# Patient Record
Sex: FEMALE | Race: WHITE | NOT HISPANIC OR LATINO | Employment: PART TIME | URBAN - METROPOLITAN AREA
[De-identification: names, ages, dates, MRNs, and addresses within clinical notes are randomized per-mention and may not be internally consistent; named-entity substitution may affect disease eponyms.]

---

## 2017-03-30 PROCEDURE — G0145 SCR C/V CYTO,THINLAYER,RESCR: HCPCS | Performed by: OBSTETRICS & GYNECOLOGY

## 2017-03-31 ENCOUNTER — LAB REQUISITION (OUTPATIENT)
Dept: LAB | Facility: HOSPITAL | Age: 57
End: 2017-03-31
Payer: COMMERCIAL

## 2017-03-31 DIAGNOSIS — Z01.419 ENCOUNTER FOR GYNECOLOGICAL EXAMINATION WITHOUT ABNORMAL FINDING: ICD-10-CM

## 2017-03-31 DIAGNOSIS — Z12.72 ENCOUNTER FOR SCREENING FOR MALIGNANT NEOPLASM OF VAGINA: ICD-10-CM

## 2017-03-31 DIAGNOSIS — Z11.51 ENCOUNTER FOR SCREENING FOR HUMAN PAPILLOMAVIRUS (HPV): ICD-10-CM

## 2017-04-05 LAB
LAB AP GYN PRIMARY INTERPRETATION: NORMAL
Lab: NORMAL

## 2017-12-20 ENCOUNTER — HOSPITAL ENCOUNTER (OUTPATIENT)
Dept: RADIOLOGY | Age: 57
Discharge: HOME | End: 2017-12-20
Payer: COMMERCIAL

## 2017-12-20 ENCOUNTER — GENERIC CONVERSION - ENCOUNTER (OUTPATIENT)
Dept: OTHER | Facility: OTHER | Age: 57
End: 2017-12-20

## 2017-12-20 DIAGNOSIS — Z12.31 ENCOUNTER FOR SCREENING MAMMOGRAM FOR MALIGNANT NEOPLASM OF BREAST: ICD-10-CM

## 2017-12-20 PROCEDURE — G0202 SCR MAMMO BI INCL CAD: HCPCS

## 2018-04-12 ENCOUNTER — LAB REQUISITION (OUTPATIENT)
Dept: LAB | Facility: HOSPITAL | Age: 58
End: 2018-04-12
Payer: COMMERCIAL

## 2018-04-12 DIAGNOSIS — Z01.419 ENCOUNTER FOR GYNECOLOGICAL EXAMINATION WITHOUT ABNORMAL FINDING: ICD-10-CM

## 2018-04-12 DIAGNOSIS — Z11.51 ENCOUNTER FOR SCREENING FOR HUMAN PAPILLOMAVIRUS (HPV): ICD-10-CM

## 2018-04-12 PROCEDURE — G0145 SCR C/V CYTO,THINLAYER,RESCR: HCPCS | Performed by: OBSTETRICS & GYNECOLOGY

## 2018-04-12 PROCEDURE — 87624 HPV HI-RISK TYP POOLED RSLT: CPT | Performed by: OBSTETRICS & GYNECOLOGY

## 2018-04-16 LAB
HPV RRNA GENITAL QL NAA+PROBE: NORMAL
LAB AP GYN PRIMARY INTERPRETATION: NORMAL
Lab: NORMAL

## 2018-12-26 ENCOUNTER — HOSPITAL ENCOUNTER (OUTPATIENT)
Dept: RADIOLOGY | Age: 58
Discharge: HOME | End: 2018-12-26
Payer: COMMERCIAL

## 2018-12-26 VITALS — BODY MASS INDEX: 21.66 KG/M2 | HEIGHT: 65 IN | WEIGHT: 130 LBS

## 2018-12-26 DIAGNOSIS — Z12.31 ENCOUNTER FOR SCREENING MAMMOGRAM FOR MALIGNANT NEOPLASM OF BREAST: ICD-10-CM

## 2018-12-26 PROCEDURE — 77063 BREAST TOMOSYNTHESIS BI: CPT

## 2018-12-26 PROCEDURE — 77067 SCR MAMMO BI INCL CAD: CPT

## 2019-04-18 ENCOUNTER — ANNUAL EXAM (OUTPATIENT)
Dept: OBGYN CLINIC | Facility: CLINIC | Age: 59
End: 2019-04-18
Payer: COMMERCIAL

## 2019-04-18 VITALS
HEIGHT: 65 IN | DIASTOLIC BLOOD PRESSURE: 62 MMHG | SYSTOLIC BLOOD PRESSURE: 100 MMHG | WEIGHT: 118 LBS | BODY MASS INDEX: 19.66 KG/M2

## 2019-04-18 DIAGNOSIS — Z12.39 SCREENING FOR BREAST CANCER: Primary | ICD-10-CM

## 2019-04-18 DIAGNOSIS — Z01.419 ENCOUNTER FOR ANNUAL ROUTINE GYNECOLOGICAL EXAMINATION: ICD-10-CM

## 2019-04-18 PROBLEM — N95.2 VAGINAL ATROPHY: Status: ACTIVE | Noted: 2019-04-18

## 2019-04-18 PROCEDURE — S0610 ANNUAL GYNECOLOGICAL EXAMINA: HCPCS | Performed by: OBSTETRICS & GYNECOLOGY

## 2019-04-18 RX ORDER — ATORVASTATIN CALCIUM 20 MG/1
TABLET, FILM COATED ORAL
Refills: 0 | COMMUNITY
Start: 2019-03-25

## 2019-04-18 RX ORDER — DESVENLAFAXINE 100 MG/1
TABLET, EXTENDED RELEASE ORAL
Refills: 0 | COMMUNITY
Start: 2019-03-27

## 2019-04-18 RX ORDER — HYDROXYCHLOROQUINE SULFATE 200 MG/1
TABLET, FILM COATED ORAL
Refills: 0 | COMMUNITY
Start: 2019-03-25

## 2019-10-04 ENCOUNTER — LAB REQUISITION (OUTPATIENT)
Dept: LAB | Facility: HOSPITAL | Age: 59
End: 2019-10-04
Payer: COMMERCIAL

## 2019-10-04 DIAGNOSIS — B95.8 UNSPECIFIED STAPHYLOCOCCUS AS THE CAUSE OF DISEASES CLASSIFIED ELSEWHERE: ICD-10-CM

## 2019-10-04 PROCEDURE — 87070 CULTURE OTHR SPECIMN AEROBIC: CPT | Performed by: PHYSICIAN ASSISTANT

## 2019-10-04 PROCEDURE — 87205 SMEAR GRAM STAIN: CPT | Performed by: PHYSICIAN ASSISTANT

## 2019-10-06 LAB
BACTERIA WND AEROBE CULT: ABNORMAL
GRAM STN SPEC: ABNORMAL
GRAM STN SPEC: ABNORMAL

## 2019-11-07 ENCOUNTER — LAB REQUISITION (OUTPATIENT)
Dept: LAB | Facility: HOSPITAL | Age: 59
End: 2019-11-07
Payer: COMMERCIAL

## 2019-11-07 DIAGNOSIS — B95.8 UNSPECIFIED STAPHYLOCOCCUS AS THE CAUSE OF DISEASES CLASSIFIED ELSEWHERE: ICD-10-CM

## 2019-11-07 PROCEDURE — 87070 CULTURE OTHR SPECIMN AEROBIC: CPT | Performed by: PHYSICIAN ASSISTANT

## 2019-11-07 PROCEDURE — 87205 SMEAR GRAM STAIN: CPT | Performed by: PHYSICIAN ASSISTANT

## 2019-11-09 LAB
BACTERIA WND AEROBE CULT: NORMAL
GRAM STN SPEC: NORMAL
GRAM STN SPEC: NORMAL

## 2019-12-02 ENCOUNTER — APPOINTMENT (EMERGENCY)
Dept: RADIOLOGY | Facility: HOSPITAL | Age: 59
End: 2019-12-02
Payer: COMMERCIAL

## 2019-12-02 ENCOUNTER — HOSPITAL ENCOUNTER (EMERGENCY)
Facility: HOSPITAL | Age: 59
Discharge: HOME | End: 2019-12-02
Attending: EMERGENCY MEDICINE | Admitting: EMERGENCY MEDICINE
Payer: COMMERCIAL

## 2019-12-02 VITALS
DIASTOLIC BLOOD PRESSURE: 64 MMHG | HEART RATE: 72 BPM | RESPIRATION RATE: 18 BRPM | TEMPERATURE: 98.7 F | OXYGEN SATURATION: 96 % | SYSTOLIC BLOOD PRESSURE: 132 MMHG | BODY MASS INDEX: 18.92 KG/M2 | WEIGHT: 113.54 LBS | HEIGHT: 65 IN

## 2019-12-02 DIAGNOSIS — R07.9 CHEST PAIN, UNSPECIFIED: Primary | ICD-10-CM

## 2019-12-02 LAB
ALBUMIN SERPL BCP-MCNC: 3.8 G/DL (ref 3.5–5)
ALP SERPL-CCNC: 73 U/L (ref 46–116)
ALT SERPL W P-5'-P-CCNC: 10 U/L (ref 12–78)
ANION GAP SERPL CALCULATED.3IONS-SCNC: 9 MMOL/L (ref 4–13)
AST SERPL W P-5'-P-CCNC: 13 U/L (ref 5–45)
BASOPHILS # BLD AUTO: 0.02 THOUSANDS/ΜL (ref 0–0.1)
BASOPHILS NFR BLD AUTO: 1 % (ref 0–1)
BILIRUB DIRECT SERPL-MCNC: 0.09 MG/DL (ref 0–0.2)
BILIRUB SERPL-MCNC: 0.31 MG/DL (ref 0.2–1)
BUN SERPL-MCNC: 18 MG/DL (ref 5–25)
CALCIUM SERPL-MCNC: 9.6 MG/DL (ref 8.3–10.1)
CHLORIDE SERPL-SCNC: 107 MMOL/L (ref 100–108)
CO2 SERPL-SCNC: 30 MMOL/L (ref 21–32)
CREAT SERPL-MCNC: 0.86 MG/DL (ref 0.6–1.3)
EOSINOPHIL # BLD AUTO: 0.07 THOUSAND/ΜL (ref 0–0.61)
EOSINOPHIL NFR BLD AUTO: 2 % (ref 0–6)
ERYTHROCYTE [DISTWIDTH] IN BLOOD BY AUTOMATED COUNT: 12 % (ref 11.6–15.1)
GFR SERPL CREATININE-BSD FRML MDRD: 73 ML/MIN/1.73SQ M
GLUCOSE SERPL-MCNC: 100 MG/DL (ref 65–140)
HCT VFR BLD AUTO: 38.1 % (ref 34.8–46.1)
HGB BLD-MCNC: 12.7 G/DL (ref 11.5–15.4)
IMM GRANULOCYTES # BLD AUTO: 0.01 THOUSAND/UL (ref 0–0.2)
IMM GRANULOCYTES NFR BLD AUTO: 0 % (ref 0–2)
LIPASE SERPL-CCNC: 133 U/L (ref 73–393)
LYMPHOCYTES # BLD AUTO: 0.89 THOUSANDS/ΜL (ref 0.6–4.47)
LYMPHOCYTES NFR BLD AUTO: 27 % (ref 14–44)
MCH RBC QN AUTO: 32.2 PG (ref 26.8–34.3)
MCHC RBC AUTO-ENTMCNC: 33.3 G/DL (ref 31.4–37.4)
MCV RBC AUTO: 97 FL (ref 82–98)
MONOCYTES # BLD AUTO: 0.33 THOUSAND/ΜL (ref 0.17–1.22)
MONOCYTES NFR BLD AUTO: 10 % (ref 4–12)
NEUTROPHILS # BLD AUTO: 1.98 THOUSANDS/ΜL (ref 1.85–7.62)
NEUTS SEG NFR BLD AUTO: 60 % (ref 43–75)
NRBC BLD AUTO-RTO: 0 /100 WBCS
PLATELET # BLD AUTO: 173 THOUSANDS/UL (ref 149–390)
PMV BLD AUTO: 9 FL (ref 8.9–12.7)
POTASSIUM SERPL-SCNC: 4 MMOL/L (ref 3.5–5.3)
PROT SERPL-MCNC: 7.5 G/DL (ref 6.4–8.2)
RBC # BLD AUTO: 3.95 MILLION/UL (ref 3.81–5.12)
SODIUM SERPL-SCNC: 146 MMOL/L (ref 136–145)
TROPONIN I SERPL-MCNC: 0.01 NG/ML
TROPONIN I SERPL-MCNC: <0.02 NG/ML
WBC # BLD AUTO: 3.3 THOUSAND/UL (ref 4.31–10.16)

## 2019-12-02 PROCEDURE — 80076 HEPATIC FUNCTION PANEL: CPT | Performed by: PHYSICIAN ASSISTANT

## 2019-12-02 PROCEDURE — 84484 ASSAY OF TROPONIN QUANT: CPT | Performed by: PHYSICIAN ASSISTANT

## 2019-12-02 PROCEDURE — 85025 COMPLETE CBC W/AUTO DIFF WBC: CPT | Performed by: PHYSICIAN ASSISTANT

## 2019-12-02 PROCEDURE — 36415 COLL VENOUS BLD VENIPUNCTURE: CPT | Performed by: PHYSICIAN ASSISTANT

## 2019-12-02 PROCEDURE — 99285 EMERGENCY DEPT VISIT HI MDM: CPT | Performed by: PHYSICIAN ASSISTANT

## 2019-12-02 PROCEDURE — 80048 BASIC METABOLIC PNL TOTAL CA: CPT | Performed by: PHYSICIAN ASSISTANT

## 2019-12-02 PROCEDURE — 83690 ASSAY OF LIPASE: CPT | Performed by: PHYSICIAN ASSISTANT

## 2019-12-02 PROCEDURE — 99285 EMERGENCY DEPT VISIT HI MDM: CPT

## 2019-12-02 PROCEDURE — 71046 X-RAY EXAM CHEST 2 VIEWS: CPT

## 2019-12-02 PROCEDURE — 93005 ELECTROCARDIOGRAM TRACING: CPT

## 2019-12-02 RX ORDER — ACETAMINOPHEN 325 MG/1
650 TABLET ORAL ONCE
Status: COMPLETED | OUTPATIENT
Start: 2019-12-02 | End: 2019-12-02

## 2019-12-02 RX ORDER — SODIUM CHLORIDE 9 MG/ML
3 INJECTION INTRAVENOUS AS NEEDED
Status: DISCONTINUED | OUTPATIENT
Start: 2019-12-02 | End: 2019-12-03 | Stop reason: HOSPADM

## 2019-12-02 RX ORDER — SENNOSIDES 8.6 MG
650 CAPSULE ORAL EVERY 8 HOURS PRN
Qty: 9 TABLET | Refills: 0 | Status: SHIPPED | OUTPATIENT
Start: 2019-12-02 | End: 2019-12-05

## 2019-12-02 RX ORDER — HYDROXYZINE HYDROCHLORIDE 25 MG/1
25 TABLET, FILM COATED ORAL ONCE
Status: COMPLETED | OUTPATIENT
Start: 2019-12-02 | End: 2019-12-02

## 2019-12-02 RX ADMIN — HYDROXYZINE HYDROCHLORIDE 25 MG: 25 TABLET ORAL at 22:03

## 2019-12-02 RX ADMIN — ACETAMINOPHEN 650 MG: 325 TABLET, FILM COATED ORAL at 22:03

## 2019-12-03 NOTE — ED PROVIDER NOTES
History  Chief Complaint   Patient presents with    Chest Pain     Pt reports sudden onset of chest tightness while she was eating dinner tonight -SOB -fevers Denies any other complaints  States she has had similar issues in the past, d/t stress  Patient is a 55-year-old female with history of polymyalgia history of breast cyst aspiration a presents emergency department with gradual onset chest tightness for 2 hours  Patient denies associated symptomatology  Patient states that she was shopping all day, lifting heavy objects and bags and when she was eating dinner this evening with her son, she felt a gradual onset chest tightness  Patient thought that her bra was too tight, and she removed abrupt her chest tightness persisting  Patient consult of her friend with patient's friends indication for patient comes emergency department for chest pain evaluation  Patient states that she had a exercise stress test 10 years prior to current ED presentation which was unremarkable to which she had at [address]  Patient denies new medications  Patient denies history of PE, DVT, and thrombophlebitis  Patient also verbalizes that she has a history of mitral valve prolapse  Patient denies blood thinners  Patient denies palliative and provocative factors  Patient denies not effective treatment  Patient denies palpitations, and chest pain abatement with rest   Patient denies fevers, chills, nausea, vomiting  Patient denies diarrhea, constipation, urinary symptoms  Patient denies headaches, tinnitus, dizziness, meningeal, and vertiginous symptoms  Patient denies numbness, tingling, loss of power  Patient denies recent fall or recent trauma  Patient denies sick contacts or recent travel  Patient denies shortness of breath, and abdominal pain  Patient is not in acute distress          History provided by:  Patient   used: No    Chest Pain   Pain location:  Substernal area  Pain quality: tightness    Pain radiates to:  Does not radiate  Pain radiates to the back: no    Pain severity:  Mild  Onset quality:  Gradual  Duration:  2 hours  Timing:  Constant  Progression:  Unchanged  Chronicity:  Recurrent  Context: stress    Relieved by:  Nothing  Worsened by:  Nothing tried  Ineffective treatments:  None tried  Associated symptoms: anxiety    Associated symptoms: no abdominal pain, no back pain, no claudication, no cough, no dizziness, no fatigue, no fever, no headache, no nausea, no numbness, no palpitations, no PND, no shortness of breath, no syncope, not vomiting and no weakness    Risk factors: high cholesterol    Risk factors: no coronary artery disease, no hypertension, not obese, no prior DVT/PE, no smoking and no surgery        Prior to Admission Medications   Prescriptions Last Dose Informant Patient Reported? Taking? Cholecalciferol 1000 units tablet   Yes No   Sig: Take 2,000 Units by mouth   atorvastatin (LIPITOR) 20 mg tablet   Yes No   cyanocobalamin 1000 MCG tablet   Yes No   Sig: Take 1,000 mcg by mouth   desvenlafaxine (PRISTIQ) 100 mg 24 hr tablet   Yes No   hydroxychloroquine (PLAQUENIL) 200 mg tablet   Yes No      Facility-Administered Medications: None       Past Medical History:   Diagnosis Date    Polymyalgia ([facility] [address] ) 2017       Past Surgical History:   Procedure Laterality Date    BREAST CYST ASPIRATION Left 1992    Benign    COLONOSCOPY  2007       Family History   Problem Relation Age of Onset    Breast cancer Paternal Aunt      I have reviewed and agree with the history as documented  Social History     Tobacco Use    Smoking status: Never Smoker    Smokeless tobacco: Never Used   Substance Use Topics    Alcohol use: Not Currently     Frequency: 2-4 times a month     Comment: occassionally    Drug use: Not on file        Review of Systems   Constitutional: Negative for activity change, appetite change, chills, fatigue and fever     HENT: Negative for congestion, postnasal drip, rhinorrhea, sinus pressure, sinus pain, sore throat and tinnitus  Eyes: Negative for photophobia and visual disturbance  Respiratory: Negative for cough, chest tightness and shortness of breath  Cardiovascular: Positive for chest pain  Negative for palpitations, claudication, syncope and PND  Gastrointestinal: Negative for abdominal pain, constipation, diarrhea, nausea and vomiting  Genitourinary: Negative for difficulty urinating, dysuria, flank pain, frequency and urgency  Musculoskeletal: Negative for back pain, gait problem, neck pain and neck stiffness  Skin: Negative for pallor and rash  Allergic/Immunologic: Negative for environmental allergies and food allergies  Neurological: Negative for dizziness, weakness, numbness and headaches  Psychiatric/Behavioral: Negative for confusion  All other systems reviewed and are negative  Physical Exam  Physical Exam   Constitutional: She is oriented to person, place, and time  She appears well-developed and well-nourished  She is active and cooperative  Non-toxic appearance  She does not have a sickly appearance  She does not appear ill  No distress  HENT:   Head: Normocephalic and atraumatic  Right Ear: Hearing and external ear normal  No drainage, swelling or tenderness  No mastoid tenderness  No decreased hearing is noted  Left Ear: Hearing and external ear normal  No drainage, swelling or tenderness  No mastoid tenderness  No decreased hearing is noted  Nose: Nose normal    Mouth/Throat: Uvula is midline, oropharynx is clear and moist and mucous membranes are normal    Eyes: Pupils are equal, round, and reactive to light  Conjunctivae, EOM and lids are normal  Right eye exhibits no discharge  Left eye exhibits no discharge  Neck: Trachea normal, normal range of motion, full passive range of motion without pain and phonation normal  Neck supple  No JVD present   No tracheal tenderness, no spinous process tenderness and no muscular tenderness present  No neck rigidity  No tracheal deviation and normal range of motion present  Cardiovascular: Normal rate, regular rhythm, normal heart sounds, intact distal pulses and normal pulses  Pulses:       Radial pulses are 2+ on the right side, and 2+ on the left side  Posterior tibial pulses are 2+ on the right side, and 2+ on the left side  Pulmonary/Chest: Effort normal and breath sounds normal  No stridor  She has no decreased breath sounds  She has no wheezes  She has no rhonchi  She has no rales  She exhibits no tenderness, no bony tenderness and no crepitus  Abdominal: Soft  Bowel sounds are normal  She exhibits no distension  There is no tenderness  There is no rigidity, no rebound, no guarding and no CVA tenderness  Musculoskeletal: Normal range of motion  Passive ROM intact  Upper and lower extremity 5/5 bilaterally  Neurovascularly intact  No grinding or clicking of joints     Lymphadenopathy:        Head (right side): No submental, no submandibular, no tonsillar, no preauricular, no posterior auricular and no occipital adenopathy present  Head (left side): No submental, no submandibular, no tonsillar, no preauricular, no posterior auricular and no occipital adenopathy present  She has no cervical adenopathy  Right cervical: No superficial cervical, no deep cervical and no posterior cervical adenopathy present  Left cervical: No superficial cervical, no deep cervical and no posterior cervical adenopathy present  Neurological: She is alert and oriented to person, place, and time  She has normal strength and normal reflexes  No sensory deficit  GCS eye subscore is 4  GCS verbal subscore is 5  GCS motor subscore is 6  Reflex Scores:       Patellar reflexes are 2+ on the right side and 2+ on the left side  Skin: Skin is warm and intact  Capillary refill takes less than 2 seconds  She is not diaphoretic  Psychiatric: Her speech is normal and behavior is normal  Judgment and thought content normal  Her mood appears anxious  Cognition and memory are normal    Nursing note and vitals reviewed        Vital Signs  ED Triage Vitals [12/02/19 1915]   Temperature Pulse Respirations Blood Pressure SpO2   98 7 °F (37 1 °C) 95 16 156/84 98 %      Temp Source Heart Rate Source Patient Position - Orthostatic VS BP Location FiO2 (%)   Oral Monitor Sitting Right arm --      Pain Score       7           Vitals:    12/02/19 1915 12/02/19 2100 12/02/19 2230 12/02/19 2300   BP: 156/84 125/59 124/61 132/64   Pulse: 95 78 72 72   Patient Position - Orthostatic VS: Sitting Sitting Sitting          Visual Acuity      ED Medications  Medications   sodium chloride (PF) 0 9 % injection 3 mL (has no administration in time range)   acetaminophen (TYLENOL) tablet 650 mg (650 mg Oral Given 12/2/19 2203)   hydrOXYzine HCL (ATARAX) tablet 25 mg (25 mg Oral Given 12/2/19 2203)       Diagnostic Studies  Results Reviewed     Procedure Component Value Units Date/Time    Troponin I repeat in 3 hrs [[id number]]  (Normal) Collected:  12/02/19 2221    Lab Status:  Final result Specimen:  Blood from Arm, Right Updated:  12/02/19 2250     Troponin I <0 02 ng/mL     Troponin I [[id number]] Collected:  12/02/19 2016    Lab Status:  Final result Specimen:  Blood from Arm, Right Updated:  12/02/19 2044     Troponin I 0 01 ng/mL     Hepatic function panel [[id number]]  (Abnormal) Collected:  12/02/19 2016    Lab Status:  Final result Specimen:  Blood from Arm, Right Updated:  12/02/19 2042     Total Bilirubin 0 31 mg/dL      Bilirubin, Direct 0 09 mg/dL      Alkaline Phosphatase 73 U/L      AST 13 U/L      ALT 10 U/L      Total Protein 7 5 g/dL      Albumin 3 8 g/dL     Lipase [[id number]]  (Normal) Collected:  12/02/19 2016    Lab Status:  Final result Specimen:  Blood from Arm, Right Updated:  12/02/19 2042     Lipase 133 u/L     Basic metabolic panel [[id number]] (Abnormal) Collected:  12/02/19 2016    Lab Status:  Final result Specimen:  Blood from Arm, Right Updated:  12/02/19 2041     Sodium 146 mmol/L      Potassium 4 0 mmol/L      Chloride 107 mmol/L      CO2 30 mmol/L      ANION GAP 9 mmol/L      BUN 18 mg/dL      Creatinine 0 86 mg/dL      Glucose 100 mg/dL      Calcium 9 6 mg/dL      eGFR 73 ml/min/1 73sq m     Narrative:       [place] guidelines for Chronic Kidney Disease (CKD):     Stage 1 with normal or high GFR (GFR > 90 mL/min/1 73 square meters)    Stage 2 Mild CKD (GFR = 60-89 mL/min/1 73 square meters)    Stage 3A Moderate CKD (GFR = 45-59 mL/min/1 73 square meters)    Stage 3B Moderate CKD (GFR = 30-44 mL/min/1 73 square meters)    Stage 4 Severe CKD (GFR = 15-29 mL/min/1 73 square meters)    Stage 5 End Stage CKD (GFR <15 mL/min/1 73 square meters)  Note: GFR calculation is accurate only with a steady state creatinine    CBC and differential [[id number]]  (Abnormal) Collected:  12/02/19 2016    Lab Status:  Final result Specimen:  Blood from Arm, Right Updated:  12/02/19 2024     WBC 3 30 Thousand/uL      RBC 3 95 Million/uL      Hemoglobin 12 7 g/dL      Hematocrit 38 1 %      MCV 97 fL      MCH 32 2 pg      MCHC 33 3 g/dL      RDW 12 0 %      MPV 9 0 fL      Platelets 353 Thousands/uL      nRBC 0 /100 WBCs      Neutrophils Relative 60 %      Immat GRANS % 0 %      Lymphocytes Relative 27 %      Monocytes Relative 10 %      Eosinophils Relative 2 %      Basophils Relative 1 %      Neutrophils Absolute 1 98 Thousands/µL      Immature Grans Absolute 0 01 Thousand/uL      Lymphocytes Absolute 0 89 Thousands/µL      Monocytes Absolute 0 33 Thousand/µL      Eosinophils Absolute 0 07 Thousand/µL      Basophils Absolute 0 02 Thousands/µL                  X-ray chest 2 views    (Results Pending)              Procedures  ECG 12 Lead Documentation Only  Date/Time: 12/2/2019 7:43 PM  Performed by: Gonzales Alex PA-C  Authorized by: Eliberto Habermann, PA-C     Indications / Diagnosis:  Chest pain - intial  ECG reviewed by me, the ED Provider: yes    Patient location:  ED  Previous ECG:     Previous ECG:  Compared to current    Similarity:  No change    Comparison to cardiac monitor: Yes    Interpretation:     Interpretation: normal    Rate:     ECG rate:  80    ECG rate assessment: normal    Rhythm:     Rhythm: sinus rhythm    Ectopy:     Ectopy: none    QRS:     QRS axis:  Normal    QRS intervals:  Normal  Conduction:     Conduction: normal    ST segments:     ST segments:  Normal  T waves:     T waves: normal      ECG 12 Lead Documentation Only  Date/Time: 12/2/2019 10:56 PM  Performed by: Eliberto Habermann, PA-C  Authorized by: Eliberto Habermann, PA-C     Indications / Diagnosis:  Delta ECG  ECG reviewed by me, the ED Provider: yes    Patient location:  ED  Previous ECG:     Previous ECG:  Compared to current    Similarity:  No change    Comparison to cardiac monitor: Yes    Interpretation:     Interpretation: normal    Rate:     ECG rate:  73    ECG rate assessment: normal    Rhythm:     Rhythm: sinus rhythm    Ectopy:     Ectopy: none    QRS:     QRS axis:  Normal    QRS intervals:  Normal  Conduction:     Conduction: normal    ST segments:     ST segments:  Normal  T waves:     T waves: normal               ED Course  ED Course as of Dec 02 2307   Mon Dec 02, 2019   1945 ECG with normal sinus rhythm; no ST elevations or depressions, FL changes no QRS widening with ventricular rate of 80; no change from ECG of May 6, 2015       2028 WBC(!): 3 30   2141 Patient verbalizes that she sees a hematologist for leukopenia currently whom [facility]      2220 Patient with verbal understanding of all clinical laboratory and imaging findings            HEART Risk Score      Most Recent Value   History  0 Filed at: 12/02/2019 2258   ECG  0 Filed at: 12/02/2019 2258   Age  1 Filed at: 12/02/2019 2258   Risk Factors  1 Filed at: 12/02/2019 2258   Troponin  0 Filed at: 12/02/2019 2258   Heart Score Risk Calculator   History  0 Filed at: 12/02/2019 2258   ECG  0 Filed at: 12/02/2019 2258   Age  1 Filed at: 12/02/2019 2258   Risk Factors  1 Filed at: 12/02/2019 2258   Troponin  0 Filed at: 12/02/2019 2258   HEART Score  2 Filed at: 12/02/2019 2258   HEART Score  2 Filed at: 12/02/2019 2258                      Vincent Lipoma' Criteria for PE      Most Recent Value   Wells' Criteria for PE   Clinical signs and symptoms of DVT  0 Filed at: 12/02/2019 1931   PE is primary diagnosis or equally likely  0 Filed at: 12/02/2019 1931   HR >100  0 Filed at: 12/02/2019 1931   Immobilization at least 3 days or Surgery in the previous 4 weeks  0 Filed at: 12/02/2019 1931   Previous, objectively diagnosed PE or DVT  0 Filed at: 12/02/2019 1931   Hemoptysis  0 Filed at: 12/02/2019 1931   Malignancy with treatment within 6 months or palliative  0 Filed at: 12/02/2019 1931   Wells' Criteria Total  0 Filed at: 12/02/2019 1931            MDM  Number of Diagnoses or Management Options  Chest pain, unspecified: new and does not require workup     Amount and/or Complexity of Data Reviewed  Clinical lab tests: ordered and reviewed  Tests in the radiology section of CPT®: ordered and reviewed  Review and summarize past medical records: yes  Independent visualization of images, tracings, or specimens: yes    Risk of Complications, Morbidity, and/or Mortality  Presenting problems: moderate  Diagnostic procedures: moderate  Management options: low    Patient Progress  Patient progress: stable     Patient is a 55-year-old female with history of polymyalgia history of breast cyst aspiration a presents emergency department with gradual onset chest tightness for 2 hours  Patient denies associated symptomatology  Patient states that she was shopping all day, lifting heavy objects and bags and when she was eating dinner this evening with her son, she felt a gradual onset chest tightness      Patient hemodynamically stable and afebrile  Patient visibly anxious and fiddling with her fingers  Patient clinical blood labs - patient has leukopenia, however patient is currently seeing hematologist for leukopenia  Patient has unremarkable BMP and hepatic function and normal lipase; normal kidney functionality, hepatobiliary pathology unlikely  Initial read on chest x-ray with no acute cardiopulmonary disease on initial read  Initial troponin negative, delta troponin negative  Initial ECG normal sinus rhythm with ventricular rate of 80, delta ECG normal sinus rhythm with ventricular rate of 73  Wells 0, Heart 2  Prescribed Tylenol and counseled patient on medication administration and side effects  Follow-up with cardiology for ordered exercise stress test  Follow-up with PCP  Follow up with emergency department symptoms persist or exacerbate  Patient demonstrates verbal understanding of all clinical laboratory and imaging findings, discharge instructions, follow-up, and treatment plan  Patient hemodynamically stable and afebrile at time of final evaluation and discharge from ED          Disposition  Final diagnoses:   Chest pain, unspecified     Time reflects when diagnosis was documented in both MDM as applicable and the Disposition within this note     Time User Action Codes Description Comment    12/2/2019 10:59 PM Tennis Sheeba Add [R07 9] Chest pain, unspecified       ED Disposition     ED Disposition Condition Date/Time Comment    Discharge Stable Mon Dec 2, 2019 10:58 PM Kenneth Solomon discharge to home/self care              Follow-up Information     Follow up With Specialties Details Why Contact Info Additional Information    [facility] Call in 2 days for further evaluation of symptoms Geremias [address] [phone] [address] [place] [address], [facility] 59    [facility] Call in 1 week for further evaluation of symptoms [address] [facility] [address] Emergency Department Emergency Medicine Go to  As needed [address] [id number]  [phone] [facility], [address], [place], [place], [address]          Discharge Medication List as of 12/2/2019 11:01 PM      START taking these medications    Details   acetaminophen (TYLENOL) 650 mg CR tablet Take 1 tablet (650 mg total) by mouth every 8 (eight) hours as needed for mild pain for up to 3 days, Starting Mon 12/2/2019, Until Thu 12/5/2019, Print         CONTINUE these medications which have NOT CHANGED    Details   atorvastatin (LIPITOR) 20 mg tablet Starting Mon 3/25/2019, Historical Med      Cholecalciferol 1000 units tablet Take 2,000 Units by mouth, Historical Med      cyanocobalamin 1000 MCG tablet Take 1,000 mcg by mouth, Historical Med      desvenlafaxine (PRISTIQ) 100 mg 24 hr tablet Starting Wed 3/27/2019, Historical Med      hydroxychloroquine (PLAQUENIL) 200 mg tablet Starting Mon 3/25/2019, Historical Med           Outpatient Discharge Orders   Stress test only, exercise   Standing Status: Future Standing Exp   Date: 01/02/20       ED Provider  Electronically Signed by           Colby Crowder PA-C  12/02/19 [id number]       Colby Crowder PA-C  12/02/19 [id number]

## 2019-12-03 NOTE — DISCHARGE INSTRUCTIONS
Take Tylenol as indicated  Follow-up with cardiology for exercise stress test  Follow-up with PCP  Follow up with emergency department symptoms persist or exacerbate

## 2019-12-04 LAB
ATRIAL RATE: 234 BPM
ATRIAL RATE: 80 BPM
P AXIS: 52 DEGREES
PR INTERVAL: 122 MS
QRS AXIS: 24 DEGREES
QRS AXIS: 34 DEGREES
QRSD INTERVAL: 90 MS
QRSD INTERVAL: 90 MS
QT INTERVAL: 392 MS
QT INTERVAL: 408 MS
QTC INTERVAL: 449 MS
QTC INTERVAL: 452 MS
T WAVE AXIS: 57 DEGREES
T WAVE AXIS: 64 DEGREES
VENTRICULAR RATE: 73 BPM
VENTRICULAR RATE: 80 BPM

## 2019-12-04 PROCEDURE — 93010 ELECTROCARDIOGRAM REPORT: CPT | Performed by: INTERNAL MEDICINE

## 2019-12-09 ENCOUNTER — HOSPITAL ENCOUNTER (OUTPATIENT)
Dept: NON INVASIVE DIAGNOSTICS | Facility: HOSPITAL | Age: 59
Discharge: HOME | End: 2019-12-09
Payer: COMMERCIAL

## 2019-12-09 DIAGNOSIS — R07.9 CHEST PAIN, UNSPECIFIED: ICD-10-CM

## 2019-12-09 LAB
CHEST PAIN STATEMENT: NORMAL
MAX DIASTOLIC BP: 78 MMHG
MAX HEART RATE: 150 BPM
MAX PREDICTED HEART RATE: 159 BPM
MAX. SYSTOLIC BP: 160 MMHG
PROTOCOL NAME: NORMAL
TARGET HR FORMULA: NORMAL
TEST INDICATION: NORMAL
TIME IN EXERCISE PHASE: NORMAL

## 2019-12-09 PROCEDURE — 93017 CV STRESS TEST TRACING ONLY: CPT

## 2019-12-09 PROCEDURE — 93016 CV STRESS TEST SUPVJ ONLY: CPT | Performed by: INTERNAL MEDICINE

## 2019-12-09 PROCEDURE — 93018 CV STRESS TEST I&R ONLY: CPT | Performed by: INTERNAL MEDICINE

## 2019-12-30 ENCOUNTER — HOSPITAL ENCOUNTER (OUTPATIENT)
Dept: RADIOLOGY | Age: 59
Discharge: HOME | End: 2019-12-30
Payer: COMMERCIAL

## 2019-12-30 VITALS — BODY MASS INDEX: 21.16 KG/M2 | WEIGHT: 127 LBS | HEIGHT: 65 IN

## 2019-12-30 DIAGNOSIS — Z12.39 SCREENING FOR BREAST CANCER: ICD-10-CM

## 2019-12-30 PROCEDURE — 77067 SCR MAMMO BI INCL CAD: CPT

## 2019-12-30 PROCEDURE — 77063 BREAST TOMOSYNTHESIS BI: CPT

## 2020-01-02 ENCOUNTER — TELEPHONE (OUTPATIENT)
Dept: CARDIOLOGY CLINIC | Facility: CLINIC | Age: 60
End: 2020-01-02

## 2020-01-02 NOTE — TELEPHONE ENCOUNTER
Spoke with patient and she had prior cardiac history over 10 years ago with [facility], denies any other cardiac history

## 2020-01-07 ENCOUNTER — OFFICE VISIT (OUTPATIENT)
Dept: CARDIOLOGY CLINIC | Facility: CLINIC | Age: 60
End: 2020-01-07
Payer: COMMERCIAL

## 2020-01-07 VITALS
OXYGEN SATURATION: 98 % | DIASTOLIC BLOOD PRESSURE: 72 MMHG | BODY MASS INDEX: 21.27 KG/M2 | WEIGHT: 127.7 LBS | HEART RATE: 87 BPM | HEIGHT: 65 IN | SYSTOLIC BLOOD PRESSURE: 118 MMHG

## 2020-01-07 DIAGNOSIS — E78.5 DYSLIPIDEMIA: ICD-10-CM

## 2020-01-07 DIAGNOSIS — R07.9 CHEST PAIN, UNSPECIFIED TYPE: Primary | ICD-10-CM

## 2020-01-07 DIAGNOSIS — I34.1 MITRAL VALVE PROLAPSE: ICD-10-CM

## 2020-01-07 PROCEDURE — 99244 OFF/OP CNSLTJ NEW/EST MOD 40: CPT | Performed by: INTERNAL MEDICINE

## 2020-01-07 NOTE — PROGRESS NOTES
Cardiology Consultation     [facility]  [id number]  1960  [address] [facility] [place] [address]      1  Chest pain, unspecified type     2  Dyslipidemia     3  Mitral valve prolapse  Echo complete with contrast if indicated       Discussion/Summary:    -- chest pain:  Atypical   Likely related to emotional stress  If her reassuring evaluation with an exercise treadmill test   Risk factors do include some borderline family history and dyslipidemia, but these are well controlled  No indication for additional testing at this time, low likelihood of this being related to coronary disease  -- dyslipidemia:  Quite well controlled on 20 mg of atorvastatin  No changes were made  -- mitral valve prolapse:  No murmur is auscultated  She denies any recent evaluation  No echocardiogram is available in our system or in Care everywhere  Echo ordered for baseline evaluation  History of Present Illness:    Pleasant 58-year-old female  She is referred to me after recent ER visit for chest pain  She has a history of dyslipidemia has been on 20 mg of atorvastatin  Also reports a history of mitral valve prolapse, diagnosed in her 25s although she has not followed regularly with a cardiologist and has not had any imaging recently  Had the chest discomfort resolved spontaneously  She was evaluated with blood work and had 2-troponin values  EKG was unremarkable  She was discharged home from the emergency room without admission and had an outpatient exercise treadmill test where she had a decent exercise tolerance, no reproduction of symptoms and normal blood pressure, heart rate, EKG response to exercise  She reports emotional stress as the trigger for her chest discomfort  She has 2 children with Asperger's an autism and this of course will raise some issues at home    She is not very active overall mostly because of orthopedic issues and polymyositis  However, with her regular activity she denies any symptoms or limitations  Blood work was done the end of December with her family physician  Lipid panel is under very good control  Father had heart attack in his 62s      Patient Active Problem List   Diagnosis    Vaginal atrophy    Chest pain    Dyslipidemia    Mitral valve prolapse     Past Medical History:   Diagnosis Date    Polymyalgia ([address] ) 2017     Social History     Tobacco Use    Smoking status: Never Smoker    Smokeless tobacco: Never Used   Substance Use Topics    Alcohol use: Not Currently     Frequency: 2-4 times a month     Comment: occassionally    Drug use: Not on file      Family History   Problem Relation Age of Onset    Breast cancer Paternal Aunt 79    No Known Problems Mother     No Known Problems Father     Lung cancer Sister 72    No Known Problems Maternal Grandmother     No Known Problems Maternal Grandfather     No Known Problems Paternal Grandmother     No Known Problems Paternal Grandfather     No Known Problems Son     No Known Problems Son     No Known Problems Maternal Aunt     No Known Problems Maternal Aunt     No Known Problems Paternal Aunt     No Known Problems Paternal Aunt      Past Surgical History:   Procedure Laterality Date    BREAST CYST ASPIRATION Left 1992    Benign    COLONOSCOPY  2007       Current Outpatient Medications:     atorvastatin (LIPITOR) 20 mg tablet, , Disp: , Rfl: 0    Cholecalciferol 1000 units tablet, Take 2,000 Units by mouth, Disp: , Rfl:     cyanocobalamin 1000 MCG tablet, Take 1,000 mcg by mouth, Disp: , Rfl:     desvenlafaxine (PRISTIQ) 100 mg 24 hr tablet, , Disp: , Rfl: 0    hydroxychloroquine (PLAQUENIL) 200 mg tablet, , Disp: , Rfl: 0  Allergies   Allergen Reactions    Dye [Iodinated Diagnostic Agents]        Vitals:    01/07/20 1714   BP: 118/72   BP Location: Right arm   Patient Position: Sitting   Cuff Size: Standard   Pulse: 87   SpO2: 98%   Weight: 57 9 kg (127 lb 11 2 oz)   Height: 5' 5" (1 651 m)     Vitals:    01/07/20 1714   Weight: 57 9 kg (127 lb 11 2 oz)      Height: 5' 5" (165 1 cm)   Body mass index is 21 25 kg/m²  Physical Exam:  GENERAL: Alert, well appearing, and in no distress  HEENT:  PERRL, EOMI, no scleral icterus, no conjunctival pallor  NECK:  Supple, No elevated JVP, no thyromegaly, no carotid bruits  HEART:  Regular rate and rhythm, normal S1/S2, no S3/S4, no murmur or rub  LUNGS:  Clear to auscultation bilaterally  ABDOMEN:  Soft, non-tender, positive bowel sounds, no rebound or guarding  EXTREMITIES:  No edema  VASCULAR:  Normal pedal pulses   NEURO: No focal deficits,  SKIN: Normal without suspicious lesions on exposed skin      ROS:  Positive for joint pains, myalgias, anxiety  Except as noted in HPI, is otherwise reviewed in detail and a 12 point review of systems is negative  Labs:  Lab Results   Component Value Date    K 4 0 12/02/2019     12/02/2019    CREATININE 0 86 12/02/2019    BUN 18 12/02/2019    CO2 30 12/02/2019    ALT 10 (L) 12/02/2019    AST 13 12/02/2019    WBC 3 30 (L) 12/02/2019    HGB 12 7 12/02/2019    HCT 38 1 12/02/2019     12/02/2019       No results found for: CHOL  No results found for: HDL  No results found for: LDLCALC  No results found for: TRIG    Testing:  Stress Test:  STRESS SUMMARY: Resting PO 98%,peak exercise PO 99%  Duration of exercise was 7 min and 43 sec  The patient exercised to protocol stage 3  Maximal work rate was 9 6 METs  Functional capacity was normal  Maximal heart rate during stress was  150 bpm ( 94 % of maximal predicted heart rate)  The heart rate response to stress was normal  There was normal resting blood pressure with an appropriate response to stress  The rate-pressure product for the peak heart rate and blood  pressure was 84285  There was no chest pain during stress   The stress test was terminated due to moderate dyspnea and moderate fatigue  The stress ECG was negative for ischemia  Arrhythmia during stress: isolated atrial premature beats      SUMMARY:  -  Stress results: Duration of exercise was 7 min and 43 sec  Maximal work rate was 9 6 METs  Functional capacity was normal  Target heart rate was achieved  There was no chest pain during stress  -  ECG conclusions: The stress ECG was negative for ischemia      IMPRESSIONS: Normal study after maximal exercise without reproduction of symptoms

## 2020-02-13 ENCOUNTER — HOSPITAL ENCOUNTER (OUTPATIENT)
Dept: NON INVASIVE DIAGNOSTICS | Facility: CLINIC | Age: 60
Discharge: HOME | End: 2020-02-13
Payer: COMMERCIAL

## 2020-02-13 ENCOUNTER — LAB REQUISITION (OUTPATIENT)
Dept: LAB | Facility: HOSPITAL | Age: 60
End: 2020-02-13
Payer: COMMERCIAL

## 2020-02-13 DIAGNOSIS — B95.8 UNSPECIFIED STAPHYLOCOCCUS AS THE CAUSE OF DISEASES CLASSIFIED ELSEWHERE: ICD-10-CM

## 2020-02-13 DIAGNOSIS — I34.1 MITRAL VALVE PROLAPSE: ICD-10-CM

## 2020-02-13 PROCEDURE — 93306 TTE W/DOPPLER COMPLETE: CPT | Performed by: INTERNAL MEDICINE

## 2020-02-13 PROCEDURE — 87205 SMEAR GRAM STAIN: CPT | Performed by: PHYSICIAN ASSISTANT

## 2020-02-13 PROCEDURE — 93306 TTE W/DOPPLER COMPLETE: CPT

## 2020-02-13 PROCEDURE — 87070 CULTURE OTHR SPECIMN AEROBIC: CPT | Performed by: PHYSICIAN ASSISTANT

## 2020-02-14 ENCOUNTER — TELEPHONE (OUTPATIENT)
Dept: CARDIOLOGY CLINIC | Facility: CLINIC | Age: 60
End: 2020-02-14

## 2020-02-14 NOTE — TELEPHONE ENCOUNTER
----- Message from Catie Franklin MD sent at 2/13/2020  5:41 PM EST -----  Please let patient know I reviewed her echo  There is not much prolapse at all, and minimal leakiness of the valve  Rest is normal  No changes, follow up 1 year as planned

## 2020-02-15 LAB
BACTERIA WND AEROBE CULT: NORMAL
GRAM STN SPEC: NORMAL

## 2020-04-22 ENCOUNTER — ANNUAL EXAM (OUTPATIENT)
Dept: OBGYN CLINIC | Facility: CLINIC | Age: 60
End: 2020-04-22
Payer: COMMERCIAL

## 2020-04-22 VITALS
BODY MASS INDEX: 21.35 KG/M2 | DIASTOLIC BLOOD PRESSURE: 80 MMHG | HEIGHT: 67 IN | WEIGHT: 136 LBS | SYSTOLIC BLOOD PRESSURE: 140 MMHG

## 2020-04-22 DIAGNOSIS — Z12.11 SCREENING FOR COLORECTAL CANCER: ICD-10-CM

## 2020-04-22 DIAGNOSIS — Z12.31 ENCOUNTER FOR SCREENING MAMMOGRAM FOR BREAST CANCER: Primary | ICD-10-CM

## 2020-04-22 DIAGNOSIS — Z12.12 SCREENING FOR COLORECTAL CANCER: ICD-10-CM

## 2020-04-22 DIAGNOSIS — Z01.419 ENCOUNTER FOR ANNUAL ROUTINE GYNECOLOGICAL EXAMINATION: ICD-10-CM

## 2020-04-22 PROCEDURE — S0612 ANNUAL GYNECOLOGICAL EXAMINA: HCPCS | Performed by: OBSTETRICS & GYNECOLOGY

## 2020-12-31 ENCOUNTER — HOSPITAL ENCOUNTER (OUTPATIENT)
Dept: RADIOLOGY | Age: 60
Discharge: HOME | End: 2020-12-31
Payer: COMMERCIAL

## 2020-12-31 VITALS — HEIGHT: 66 IN | WEIGHT: 128 LBS | BODY MASS INDEX: 20.57 KG/M2

## 2020-12-31 DIAGNOSIS — Z12.31 ENCOUNTER FOR SCREENING MAMMOGRAM FOR BREAST CANCER: ICD-10-CM

## 2020-12-31 PROCEDURE — 77063 BREAST TOMOSYNTHESIS BI: CPT

## 2020-12-31 PROCEDURE — 77067 SCR MAMMO BI INCL CAD: CPT

## 2021-06-07 ENCOUNTER — OFFICE VISIT (OUTPATIENT)
Dept: CARDIOLOGY CLINIC | Facility: CLINIC | Age: 61
End: 2021-06-07
Payer: COMMERCIAL

## 2021-06-07 VITALS
BODY MASS INDEX: 21.78 KG/M2 | WEIGHT: 135.5 LBS | SYSTOLIC BLOOD PRESSURE: 128 MMHG | OXYGEN SATURATION: 96 % | HEIGHT: 66 IN | DIASTOLIC BLOOD PRESSURE: 80 MMHG | HEART RATE: 74 BPM

## 2021-06-07 DIAGNOSIS — E78.5 DYSLIPIDEMIA: ICD-10-CM

## 2021-06-07 DIAGNOSIS — I34.1 MITRAL VALVE PROLAPSE: Primary | ICD-10-CM

## 2021-06-07 DIAGNOSIS — R07.89 ATYPICAL CHEST PAIN: ICD-10-CM

## 2021-06-07 PROCEDURE — 93000 ELECTROCARDIOGRAM COMPLETE: CPT | Performed by: INTERNAL MEDICINE

## 2021-06-07 PROCEDURE — 99214 OFFICE O/P EST MOD 30 MIN: CPT | Performed by: INTERNAL MEDICINE

## 2021-06-07 NOTE — PROGRESS NOTES
Cardiology Follow Up    Eugene Whiteside  [id number]  1960  [address] [facility] [place] [address]      1  Mitral valve prolapse  POCT ECG   2  Atypical chest pain     3  Dyslipidemia         Discussion/Summary:    -- atypical chest pain:  No recent recurrence  Unremarkable stress test in 2019  No changes  - mitral valve prolapse by report:  Echo here in 2020 showed mild restriction of the posterior leaflet with mild mitral regurgitation  Minimal murmur on exam  Continue clinical surveillance  - Dyslipidemia:  Continue atorvastatin  She can take this at a different time if she is missing several doses a week because she falls asleep  Previous History:  Pleasant 56-year-old female  Previously seen for atypical chest pain  She has a history of dyslipidemia has been on 20 mg of atorvastatin  Also reports a history of mitral valve prolapse, diagnosed in her 25s, the echo here showed some restriction of the posterior leaflet but no significant prolapse  Just mild MR  Interval History:  Returns for regular follow-up  She had COVID in December, but recovered  No cardiac symptoms  No significant lower extremity edema  She is not very active overall  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  Blood work done back in November  Lipid panel well controlled  She takes her atorvastatin at night, but does miss doses because she says it says to take at bedtime        Patient Active Problem List   Diagnosis    Vaginal atrophy    Atypical chest pain    Dyslipidemia    Mitral valve prolapse     Past Medical History:   Diagnosis Date    Abnormal Pap smear of cervix     MAVERICK 1    COVID-19     Leukopenia     Papanicolaou smear for cervical cancer screening 04/2018    neg    [address]ym[facility] ([facility] [address] ) 2017    Post-menopausal      Social History     Tobacco Use    Smoking status: Never Smoker    Smokeless tobacco: Never Used   Substance Use Topics    Alcohol use: Yes     Frequency: 2-4 times a month     Comment: occassionally    Drug use: Not on file      Family History   Problem Relation Age of Onset    Breast cancer Paternal Aunt 79    No Known Problems Mother     Heart attack Father     Lung cancer Sister 72    No Known Problems Maternal Grandmother     No Known Problems Maternal Grandfather     No Known Problems Paternal Grandmother     No Known Problems Paternal Grandfather     No Known Problems Son     No Known Problems Son     No Known Problems Maternal Aunt     No Known Problems Maternal Aunt     No Known Problems Paternal Aunt     No Known Problems Paternal Aunt      Past Surgical History:   Procedure Laterality Date    BREAST CYST ASPIRATION Left 1992    Benign    BREAST CYST ASPIRATION      COLONOSCOPY  2007    MAMMO (HISTORICAL) Bilateral 12/2018    neg       Current Outpatient Medications:     atorvastatin (LIPITOR) 20 mg tablet, , Disp: , Rfl: 0    Cholecalciferol 1000 units tablet, Take 2,000 Units by mouth, Disp: , Rfl:     cyanocobalamin 1000 MCG tablet, Take 1,000 mcg by mouth, Disp: , Rfl:     desvenlafaxine (PRISTIQ) 100 mg 24 hr tablet, , Disp: , Rfl: 0    hydroxychloroquine (PLAQUENIL) 200 mg tablet, , Disp: , Rfl: 0  Allergies   Allergen Reactions    Dye [Iodinated Diagnostic Agents]        Vitals:    06/07/21 0847   BP: 128/80   BP Location: Right arm   Patient Position: Sitting   Cuff Size: Standard   Pulse: 74   SpO2: 96%   Weight: 61 5 kg (135 lb 8 oz)   Height: 5' 6" (1 676 m)     Vitals:    06/07/21 0847   Weight: 61 5 kg (135 lb 8 oz)      Height: 5' 6" (167 6 cm)   Body mass index is 21 87 kg/m²  Physical Exam:  GENERAL: Alert, well appearing, and in no distress  HEENT:  PERRL, EOMI, no scleral icterus, no conjunctival pallor  NECK:  Supple, No elevated JVP, no thyromegaly, no carotid bruits  HEART: 2/6 HSM    LUNGS:  Clear to auscultation bilaterally  ABDOMEN:  Soft, non-tender, positive bowel sounds, no rebound or guarding  EXTREMITIES:  No edema  VASCULAR:  Normal pedal pulses   NEURO: No focal deficits,  SKIN: Normal without suspicious lesions on exposed skin      ROS:  Positive for joint pains, myalgias, anxiety  Except as noted in HPI, is otherwise reviewed in detail and a 12 point review of systems is negative  Labs:  Lab Results   Component Value Date    K 4 0 12/02/2019     12/02/2019    CREATININE 0 86 12/02/2019    BUN 18 12/02/2019    CO2 30 12/02/2019    ALT 10 (L) 12/02/2019    AST 13 12/02/2019    WBC 3 30 (L) 12/02/2019    HGB 12 7 12/02/2019    HCT 38 1 12/02/2019     12/02/2019       No results found for: CHOL  No results found for: HDL  No results found for: LDLCALC  No results found for: TRIG    Testing:  Echo 2/13/20:  LEFT VENTRICLE:  Systolic function was normal  Ejection fraction was estimated to be 65 %  There were no regional wall motion abnormalities      MITRAL VALVE:  There was mildly restricted mobility of the posterior leaflet  There was mild, slightly posteriorly directed regurgitation  Stress Test 12/9/2019:  STRESS SUMMARY: Resting PO 98%,peak exercise PO 99%  Duration of exercise was 7 min and 43 sec  The patient exercised to protocol stage 3  Maximal work rate was 9 6 METs  Functional capacity was normal  Maximal heart rate during stress was  150 bpm ( 94 % of maximal predicted heart rate)  The heart rate response to stress was normal  There was normal resting blood pressure with an appropriate response to stress  The rate-pressure product for the peak heart rate and blood  pressure was 85542  There was no chest pain during stress  The stress test was terminated due to moderate dyspnea and moderate fatigue  The stress ECG was negative for ischemia  Arrhythmia during stress: isolated atrial premature beats      SUMMARY:  -  Stress results: Duration of exercise was 7 min and 43 sec   Maximal work rate was 9 6 METs  Functional capacity was normal  Target heart rate was achieved  There was no chest pain during stress  -  ECG conclusions: The stress ECG was negative for ischemia      IMPRESSIONS: Normal study after maximal exercise without reproduction of symptoms  EKG:  Sinus rhythm  74 beats per minute  Normal EKG

## 2022-01-21 ENCOUNTER — HOSPITAL ENCOUNTER (OUTPATIENT)
Dept: RADIOLOGY | Age: 62
Discharge: HOME | End: 2022-01-21
Payer: COMMERCIAL

## 2022-01-21 VITALS — WEIGHT: 140 LBS | HEIGHT: 66 IN | BODY MASS INDEX: 22.5 KG/M2

## 2022-01-21 DIAGNOSIS — Z12.31 ENCOUNTER FOR SCREENING MAMMOGRAM FOR MALIGNANT NEOPLASM OF BREAST: ICD-10-CM

## 2022-01-21 PROCEDURE — 77067 SCR MAMMO BI INCL CAD: CPT

## 2022-01-21 PROCEDURE — 77063 BREAST TOMOSYNTHESIS BI: CPT

## 2022-05-02 ENCOUNTER — ANNUAL EXAM (OUTPATIENT)
Dept: OBGYN CLINIC | Facility: CLINIC | Age: 62
End: 2022-05-02
Payer: COMMERCIAL

## 2022-05-02 VITALS
WEIGHT: 143 LBS | DIASTOLIC BLOOD PRESSURE: 70 MMHG | BODY MASS INDEX: 23.82 KG/M2 | HEIGHT: 65 IN | SYSTOLIC BLOOD PRESSURE: 130 MMHG

## 2022-05-02 DIAGNOSIS — Z12.31 ENCOUNTER FOR SCREENING MAMMOGRAM FOR BREAST CANCER: Primary | ICD-10-CM

## 2022-05-02 DIAGNOSIS — Z12.11 SCREENING FOR COLORECTAL CANCER: ICD-10-CM

## 2022-05-02 DIAGNOSIS — Z01.419 ENCOUNTER FOR ANNUAL ROUTINE GYNECOLOGICAL EXAMINATION: ICD-10-CM

## 2022-05-02 DIAGNOSIS — Z12.12 SCREENING FOR COLORECTAL CANCER: ICD-10-CM

## 2022-05-02 DIAGNOSIS — Z11.51 SCREENING FOR HPV (HUMAN PAPILLOMAVIRUS): ICD-10-CM

## 2022-05-02 DIAGNOSIS — Z01.419 ROUTINE GYNECOLOGICAL EXAMINATION: ICD-10-CM

## 2022-05-02 PROCEDURE — S0612 ANNUAL GYNECOLOGICAL EXAMINA: HCPCS | Performed by: OBSTETRICS & GYNECOLOGY

## 2022-05-02 PROCEDURE — G0476 HPV COMBO ASSAY CA SCREEN: HCPCS | Performed by: OBSTETRICS & GYNECOLOGY

## 2022-05-02 PROCEDURE — G0145 SCR C/V CYTO,THINLAYER,RESCR: HCPCS | Performed by: OBSTETRICS & GYNECOLOGY

## 2022-05-02 NOTE — PROGRESS NOTES
The patient is here for a yearly  Pap normal HPV neg 4/12/18  No bleeding or cramping  The patient had a bad odor in her urine and she saw a urologist last year  The patient noticed blood after wiping after a bowel movement  The patient noticed it a few months ago  No vaginal or breast problems

## 2022-05-02 NOTE — PROGRESS NOTES
Assessment/Plan:    Normal breast and gyn exam  New onset of rectal spotting with bowel movements  Quivering of lips x2 years  Normal mammogram 2022  COVID infection 2020  Colonoscopy with polyps according to patient in   COVID vaccine in booster  Status post spontaneous vaginal delivery x1 and  x1    Plan:  Check Pap smear  Rx mammogram   Rx colonoscopy  Continue healthy diet exercise  Call family physician to evaluate for lip quivering  Subjective:      Patient ID: Damien Toro is a 59 y o  female presents for yearly exam with no complaints  Patient denies any pelvic pain or vaginal bleeding  Denies any breast or bladder issues  Has noticed rectal spotting with bowel movements recently  Patient denies any abdominal pain  Has not been constipated  Also complaining of lip quivering x2 years  She has been on Pristiq longer than 2 years  It is unknown possible side effect  However her brother and her aunt has the same condition  Neither have been diagnosed  Recommend that she calls her family physician to get evaluated  No change in family history:  Paternal aunt with breast cancer  Medications reviewed  Review of Systems   Constitutional: Negative  Negative for fatigue, fever and unexpected weight change  Spontaneous lip quivering   HENT: Negative  Eyes: Negative  Respiratory: Negative  Negative for chest tightness, shortness of breath, wheezing and stridor  Cardiovascular: Negative  Negative for chest pain, palpitations and leg swelling  Gastrointestinal: Negative  Negative for abdominal pain, blood in stool, diarrhea, nausea, rectal pain and vomiting  Endocrine: Negative  Genitourinary: Negative for dysuria, frequency, vaginal bleeding, vaginal discharge and vaginal pain  Musculoskeletal: Negative  Skin: Negative  Allergic/Immunologic: Negative  Neurological: Negative  Hematological: Negative  Psychiatric/Behavioral: Negative  All other systems reviewed and are negative  Objective:      /70   Ht 5' 5" (1 651 m)   Wt 64 9 kg (143 lb)   LMP  (LMP Unknown)   BMI 23 80 kg/m²          Physical Exam  Constitutional:       Appearance: She is well-developed  HENT:      Head: Normocephalic and atraumatic  Comments: Spontaneous lower lip quivering  Neck:      Thyroid: No thyromegaly  Trachea: No tracheal deviation  Cardiovascular:      Rate and Rhythm: Normal rate and regular rhythm  Heart sounds: Normal heart sounds  Pulmonary:      Effort: Pulmonary effort is normal  No respiratory distress  Breath sounds: Normal breath sounds  No stridor  No wheezing or rales  Chest:      Chest wall: No tenderness  Breasts: Breasts are symmetrical       Right: No inverted nipple, mass, nipple discharge, skin change or tenderness  Left: No inverted nipple, mass, nipple discharge, skin change or tenderness  Abdominal:      General: Bowel sounds are normal  There is no distension  Palpations: Abdomen is soft  There is no mass  Tenderness: There is no abdominal tenderness  There is no guarding or rebound  Hernia: No hernia is present  There is no hernia in the left inguinal area  Genitourinary:     Labia:         Right: No rash, tenderness, lesion or injury  Left: No rash, tenderness, lesion or injury  Vagina: Normal  No signs of injury and foreign body  No vaginal discharge, erythema, tenderness or bleeding  Cervix: No cervical motion tenderness, discharge or friability  Uterus: Not deviated, not enlarged, not fixed and not tender  Adnexa:         Right: No mass, tenderness or fullness  Left: No mass, tenderness or fullness  Rectum: No mass, anal fissure, external hemorrhoid or internal hemorrhoid  Musculoskeletal:      Cervical back: Normal range of motion and neck supple     Lymphadenopathy:      Lower Body: No right inguinal adenopathy  No left inguinal adenopathy  Skin:     General: Skin is warm and dry  Neurological:      Mental Status: She is alert and oriented to person, place, and time  Psychiatric:         Behavior: Behavior normal          Thought Content:  Thought content normal          Judgment: Judgment normal

## 2022-05-05 LAB
HPV HR 12 DNA CVX QL NAA+PROBE: NEGATIVE
HPV16 DNA CVX QL NAA+PROBE: NEGATIVE
HPV18 DNA CVX QL NAA+PROBE: NEGATIVE

## 2022-05-10 LAB
LAB AP GYN PRIMARY INTERPRETATION: NORMAL
Lab: NORMAL

## 2022-05-12 DIAGNOSIS — N76.89 OTHER SPECIFIED INFLAMMATION OF VAGINA AND VULVA: Primary | ICD-10-CM

## 2022-05-12 RX ORDER — METRONIDAZOLE 7.5 MG/G
1 GEL VAGINAL
Qty: 70 G | Refills: 0 | Status: SHIPPED | OUTPATIENT
Start: 2022-05-12

## 2022-06-20 ENCOUNTER — OFFICE VISIT (OUTPATIENT)
Dept: OBGYN CLINIC | Facility: CLINIC | Age: 62
End: 2022-06-20

## 2022-06-20 VITALS
SYSTOLIC BLOOD PRESSURE: 110 MMHG | HEIGHT: 65 IN | WEIGHT: 144 LBS | DIASTOLIC BLOOD PRESSURE: 70 MMHG | BODY MASS INDEX: 23.99 KG/M2

## 2022-06-20 DIAGNOSIS — R87.615 UNSATISFACTORY CERVICAL CYTOLOGY SMEAR: Primary | ICD-10-CM

## 2022-06-20 PROCEDURE — NC001 PR NO CHARGE: Performed by: OBSTETRICS & GYNECOLOGY

## 2022-06-20 PROCEDURE — 88175 CYTOPATH C/V AUTO FLUID REDO: CPT | Performed by: OBSTETRICS & GYNECOLOGY

## 2022-06-20 NOTE — PROGRESS NOTES
Patient presents to the office for repeat Pap smear  Her Pap smear done at her yearly exam was inadequate  She denies any gyn complaints  Pap smear was repeated today  Her pelvic exam was within normal limits except for mild vaginal atrophy  Impression:  Unsatisfactory cervical cytology    Plan:  Check Pap smear    Return to office for annual exam

## 2022-06-27 LAB
LAB AP GYN PRIMARY INTERPRETATION: NORMAL
Lab: NORMAL

## 2022-09-21 ENCOUNTER — TELEPHONE (OUTPATIENT)
Dept: GASTROENTEROLOGY | Facility: CLINIC | Age: 62
End: 2022-09-21

## 2022-09-21 ENCOUNTER — PREP FOR PROCEDURE (OUTPATIENT)
Dept: GASTROENTEROLOGY | Facility: CLINIC | Age: 62
End: 2022-09-21

## 2022-09-21 DIAGNOSIS — Z12.11 COLON CANCER SCREENING: Primary | ICD-10-CM

## 2022-09-21 NOTE — TELEPHONE ENCOUNTER
09/21/22  Screened by: Billy Saavedra MA    Referring Provider Dr Rosas    Pre- Screening: Body mass index is 23 96 kg/m²  Has patient been referred for a routine screening Colonoscopy? yes  Is the patient between 39-70 years old? yes      Previous Colonoscopy yes   If yes:    Date: 16 yrs ago    Facility:     Reason: screening      SCHEDULING STAFF: If the patient is between 45yrs-49yrs, please advise patient to confirm benefits/coverage with their insurance company for a routine screening colonoscopy, some insurance carriers will only cover at [phone] or older  If the patient is over 66years old, please schedule an office visit  Does the patient want to see a Gastroenterologist prior to their procedure OR are they having any GI symptoms? no    Has the patient been hospitalized or had abdominal surgery in the past 6 months? no    Does the patient use supplemental oxygen? no    Does the patient take Coumadin, Lovenox, Plavix, Elliquis, Xarelto, or other blood thinning medication? no    Has the patient had a stroke, cardiac event, or stent placed in the past year? no    SCHEDULING STAFF: If patient answers NO to above questions, then schedule procedure  If patient answers YES to above questions, then schedule office appointment  If patient is between 45yrs - 49yrs, please advise patient that we will have to confirm benefits & coverage with their insurance company for a routine screening colonoscopy          Scheduled date of colonoscopy (as of today): 12/19/22  Physician performing colonoscopy:Dr Brooks  Location of colonoscopy:[facility]  Bowel prep reviewed with patient: Miralax/Dulcolax  Instructions reviewed with patient by:Sadie/wilner  Clearances: N/A

## 2022-12-19 ENCOUNTER — ANESTHESIA EVENT (OUTPATIENT)
Dept: GASTROENTEROLOGY | Facility: AMBULARY SURGERY CENTER | Age: 62
End: 2022-12-19

## 2022-12-19 ENCOUNTER — HOSPITAL ENCOUNTER (OUTPATIENT)
Dept: GASTROENTEROLOGY | Facility: AMBULARY SURGERY CENTER | Age: 62
Setting detail: OUTPATIENT SURGERY
Discharge: HOME | End: 2022-12-19
Attending: INTERNAL MEDICINE

## 2022-12-19 ENCOUNTER — ANESTHESIA (OUTPATIENT)
Dept: GASTROENTEROLOGY | Facility: AMBULARY SURGERY CENTER | Age: 62
End: 2022-12-19

## 2022-12-19 VITALS
HEART RATE: 71 BPM | TEMPERATURE: 97.4 F | BODY MASS INDEX: 23.14 KG/M2 | RESPIRATION RATE: 22 BRPM | WEIGHT: 144 LBS | SYSTOLIC BLOOD PRESSURE: 127 MMHG | OXYGEN SATURATION: 99 % | HEIGHT: 66 IN | DIASTOLIC BLOOD PRESSURE: 62 MMHG

## 2022-12-19 DIAGNOSIS — Z12.11 COLON CANCER SCREENING: ICD-10-CM

## 2022-12-19 RX ORDER — LIDOCAINE HYDROCHLORIDE 10 MG/ML
INJECTION, SOLUTION EPIDURAL; INFILTRATION; INTRACAUDAL; PERINEURAL AS NEEDED
Status: DISCONTINUED | OUTPATIENT
Start: 2022-12-19 | End: 2022-12-19

## 2022-12-19 RX ORDER — SODIUM CHLORIDE, SODIUM LACTATE, POTASSIUM CHLORIDE, CALCIUM CHLORIDE 600; 310; 30; 20 MG/100ML; MG/100ML; MG/100ML; MG/100ML
INJECTION, SOLUTION INTRAVENOUS CONTINUOUS PRN
Status: DISCONTINUED | OUTPATIENT
Start: 2022-12-19 | End: 2022-12-19

## 2022-12-19 RX ORDER — PROPOFOL 10 MG/ML
INJECTION, EMULSION INTRAVENOUS AS NEEDED
Status: DISCONTINUED | OUTPATIENT
Start: 2022-12-19 | End: 2022-12-19

## 2022-12-19 RX ADMIN — PROPOFOL 20 MG: 10 INJECTION, EMULSION INTRAVENOUS at 08:17

## 2022-12-19 RX ADMIN — PROPOFOL 20 MG: 10 INJECTION, EMULSION INTRAVENOUS at 08:20

## 2022-12-19 RX ADMIN — LIDOCAINE HYDROCHLORIDE 30 MG: 10 INJECTION, SOLUTION EPIDURAL; INFILTRATION; INTRACAUDAL at 08:11

## 2022-12-19 RX ADMIN — SODIUM CHLORIDE, SODIUM LACTATE, POTASSIUM CHLORIDE, AND CALCIUM CHLORIDE: .6; .31; .03; .02 INJECTION, SOLUTION INTRAVENOUS at 07:25

## 2022-12-19 RX ADMIN — PROPOFOL 100 MG: 10 INJECTION, EMULSION INTRAVENOUS at 08:11

## 2022-12-19 RX ADMIN — PROPOFOL 20 MG: 10 INJECTION, EMULSION INTRAVENOUS at 08:30

## 2022-12-19 RX ADMIN — PROPOFOL 20 MG: 10 INJECTION, EMULSION INTRAVENOUS at 08:14

## 2022-12-19 RX ADMIN — PROPOFOL 20 MG: 10 INJECTION, EMULSION INTRAVENOUS at 08:23

## 2022-12-19 RX ADMIN — PROPOFOL 20 MG: 10 INJECTION, EMULSION INTRAVENOUS at 08:27

## 2022-12-19 RX ADMIN — Medication 40 MG: at 08:22

## 2022-12-19 NOTE — ANESTHESIA PREPROCEDURE EVALUATION
Procedure:  COLONOSCOPY    ECHO (12/19/22):  SUMMARY     LEFT VENTRICLE:  Systolic function was normal  Ejection fraction was estimated to be 65 %  There were no regional wall motion abnormalities      MITRAL VALVE:  There was mildly restricted mobility of the posterior leaflet  There was mild, slightly posteriorly directed regurgitation  Relevant Problems   ANESTHESIA (within normal limits)      CARDIO   (+) Atypical chest pain   (+) Mitral valve prolapse      ENDO (within normal limits)      GI/HEPATIC (within normal limits)      /RENAL (within normal limits)      GYN (within normal limits)      HEMATOLOGY (within normal limits)      MUSCULOSKELETAL (within normal limits)      NEURO/PSYCH (within normal limits)      PULMONARY (within normal limits)        Physical Exam    Airway    Mallampati score: II    Neck ROM: full     Dental   No notable dental hx     Cardiovascular  Rhythm: regular, Rate: normal, Murmur,     Pulmonary  Pulmonary exam normal Breath sounds clear to auscultation,     Other Findings        Anesthesia Plan  ASA Score- 2     Anesthesia Type- IV sedation with anesthesia with ASA Monitors  Additional Monitors:   Airway Plan:           Plan Factors-Exercise tolerance (METS): >4 METS  Chart reviewed  EKG reviewed  Imaging results reviewed  Existing labs reviewed  Patient summary reviewed  Patient is not a current smoker  Patient did not smoke on day of surgery  Obstructive sleep apnea risk education given perioperatively  Induction- intravenous  Postoperative Plan-     Informed Consent- Anesthetic plan and risks discussed with patient  I personally reviewed this patient with the CRNA  Discussed and agreed on the Anesthesia Plan with the LISE Bravo

## 2022-12-19 NOTE — H&P
History and Physical - [facility]  Pilar Jean 57 y o  female MRN: [id number]    HPI: Pilar Jean is a 57y o  year old female who presents for colon cancer screening        Review of Systems    Historical Information   Past Medical History:   Diagnosis Date   • Abnormal Pap smear of cervix     MAVERICK 1   • COVID-19    • Leukopenia    • Papanicolaou smear for cervical cancer screening 04/2018    neg   • Polymyalgia ([address] ) 2017   • Post-menopausal      Past Surgical History:   Procedure Laterality Date   • BREAST CYST ASPIRATION Left 1992    Benign   • BREAST CYST ASPIRATION     • COLONOSCOPY  2007   • MAMMO (HISTORICAL) Bilateral 12/2018    neg     Social History   Social History     Substance and Sexual Activity   Alcohol Use Yes    Comment: occassionally     Social History     Substance and Sexual Activity   Drug Use Not on file     Social History     Tobacco Use   Smoking Status Never   Smokeless Tobacco Never     Family History   Problem Relation Age of Onset   • Breast cancer Paternal Aunt 79   • No Known Problems Mother    • Heart attack Father    • Lung cancer Sister 72   • No Known Problems Maternal Grandmother    • No Known Problems Maternal Grandfather    • No Known Problems Paternal Grandmother    • No Known Problems Paternal Grandfather    • No Known Problems Son    • No Known Problems Son    • No Known Problems Maternal Aunt    • No Known Problems Maternal Aunt    • No Known Problems Paternal Aunt    • No Known Problems Paternal Aunt    • Colon cancer Cousin        Meds/Allergies     (Not in a hospital admission)      Allergies   Allergen Reactions   • Dye [Iodinated Diagnostic Agents]        Objective     /64   Pulse 70   Temp (!) 97 °F (36 1 °C) (Temporal)   Resp 18   Ht 5' 6" (1 676 m)   Wt 65 3 kg (144 lb)   LMP  (LMP Unknown)   SpO2 98%   BMI 23 24 kg/m²       PHYSICAL EXAM    Gen: NAD  CV: RRR  CHEST: Clear  ABD: soft, NT/ND  EXT: no edema  Neuro: AAO      ASSESSMENT/PLAN:  This is a 57y o  year old female here for colon cancer screening, previous colonoscopy was 16 years ago  PLAN:   Procedure: Colonoscopy

## 2023-03-02 ENCOUNTER — HOSPITAL ENCOUNTER (OUTPATIENT)
Dept: RADIOLOGY | Age: 63
Discharge: HOME | End: 2023-03-02

## 2023-03-02 VITALS — BODY MASS INDEX: 22.29 KG/M2 | WEIGHT: 142 LBS | HEIGHT: 67 IN

## 2023-03-02 DIAGNOSIS — Z12.31 ENCOUNTER FOR SCREENING MAMMOGRAM FOR BREAST CANCER: ICD-10-CM

## 2023-05-11 ENCOUNTER — ANNUAL EXAM (OUTPATIENT)
Dept: GYNECOLOGY | Facility: CLINIC | Age: 63
End: 2023-05-11

## 2023-05-11 VITALS
SYSTOLIC BLOOD PRESSURE: 126 MMHG | HEIGHT: 67 IN | DIASTOLIC BLOOD PRESSURE: 64 MMHG | BODY MASS INDEX: 22.6 KG/M2 | WEIGHT: 144 LBS

## 2023-05-11 DIAGNOSIS — F41.9 ANXIETY: ICD-10-CM

## 2023-05-11 DIAGNOSIS — Z01.419 ENCOUNTER FOR ANNUAL ROUTINE GYNECOLOGICAL EXAMINATION: ICD-10-CM

## 2023-05-11 DIAGNOSIS — Z12.31 SCREENING MAMMOGRAM FOR BREAST CANCER: Primary | ICD-10-CM

## 2023-05-11 RX ORDER — CALCIUM CARBONATE/VITAMIN D3 600 MG-10
TABLET ORAL
COMMUNITY

## 2023-05-11 RX ORDER — DESVENLAFAXINE SUCCINATE 50 MG/1
50 TABLET, EXTENDED RELEASE ORAL DAILY
COMMUNITY

## 2023-05-11 NOTE — PROGRESS NOTES
"Assessment/Plan:  Normal breast and GYN exam  Normal Pap smear 2022  Normal mammogram 2023  Normal colonoscopy 2022  Due for repeat at 10 years  History of MAVERICK-1  DEXA scan 2022 showing osteoporosis  On Prolia    Plan: Rx mammogram   Continue healthy diet  recommend weightbearing exercise  Continue vitamin D and calcium    Subjective:   and a      Patient ID: Torri Haley is a 57 y o  female presents for annual exam with no complaints  Patient denies any vaginal bleeding or pelvic pain  Presently not sexually active  No plaints of breast bowel or bladder issues  Had 1 episode of constipation resulting in slight rectal spotting  No episodes since  Normal colonoscopy 2022  No change in family history  Medications reviewed  Review of Systems   Constitutional: Negative  Negative for fatigue, fever and unexpected weight change  HENT: Negative  Eyes: Negative  Respiratory: Negative  Negative for chest tightness, shortness of breath, wheezing and stridor  Cardiovascular: Negative  Negative for chest pain, palpitations and leg swelling  Gastrointestinal: Negative  Negative for abdominal pain, blood in stool, diarrhea, nausea, rectal pain and vomiting  Endocrine: Negative  Genitourinary: Negative for dysuria, frequency, vaginal bleeding, vaginal discharge and vaginal pain  Musculoskeletal: Negative  Skin: Negative  Allergic/Immunologic: Negative  Neurological: Negative  Hematological: Negative  Psychiatric/Behavioral: Negative  All other systems reviewed and are negative  Objective:      /64   Ht 5' 7\" (1 702 m)   Wt 65 3 kg (144 lb)   LMP  (LMP Unknown)   BMI 22 55 kg/m²          Physical Exam  Constitutional:       Appearance: She is well-developed  HENT:      Head: Normocephalic and atraumatic  Neck:      Thyroid: No thyromegaly  Trachea: No tracheal deviation     Cardiovascular:      " Rate and Rhythm: Normal rate and regular rhythm  Heart sounds: Normal heart sounds  Pulmonary:      Effort: Pulmonary effort is normal  No respiratory distress  Breath sounds: Normal breath sounds  No stridor  No wheezing or rales  Chest:      Chest wall: No tenderness  Breasts:     Breasts are symmetrical       Right: No inverted nipple, mass, nipple discharge, skin change or tenderness  Left: No inverted nipple, mass, nipple discharge, skin change or tenderness  Abdominal:      General: Bowel sounds are normal  There is no distension  Palpations: Abdomen is soft  There is no mass  Tenderness: There is no abdominal tenderness  There is no guarding or rebound  Hernia: No hernia is present  There is no hernia in the left inguinal area  Genitourinary:     Labia:         Right: No rash, tenderness, lesion or injury  Left: No rash, tenderness, lesion or injury  Vagina: Normal  No signs of injury and foreign body  No vaginal discharge, erythema, tenderness or bleeding  Cervix: No cervical motion tenderness, discharge or friability  Uterus: Not deviated, not enlarged, not fixed and not tender  Adnexa:         Right: No mass, tenderness or fullness  Left: No mass, tenderness or fullness  Rectum: No mass, anal fissure, external hemorrhoid or internal hemorrhoid  Comments: Normal urethra and Urbank's glands  No uterine prolapse  No cystocele or rectocele  Musculoskeletal:      Cervical back: Normal range of motion and neck supple  Lymphadenopathy:      Lower Body: No right inguinal adenopathy  No left inguinal adenopathy  Skin:     General: Skin is warm and dry  Neurological:      Mental Status: She is alert and oriented to person, place, and time  Psychiatric:         Behavior: Behavior normal          Thought Content:  Thought content normal          Judgment: Judgment normal  negative Soft, non-tender, no hepatosplenomegaly, normal bowel sounds

## 2023-06-07 ENCOUNTER — TELEPHONE (OUTPATIENT)
Dept: PSYCHIATRY | Facility: CLINIC | Age: 63
End: 2023-06-07

## 2023-06-07 NOTE — TELEPHONE ENCOUNTER
Spoke to patient in regards to routine referral, inform patient of the wait list and patient would like to be added to wait list  Patient has been added to epic wait list for [facility], [place] [facility], male or female

## 2024-03-07 ENCOUNTER — HOSPITAL ENCOUNTER (OUTPATIENT)
Dept: RADIOLOGY | Age: 64
Discharge: HOME | End: 2024-03-07
Payer: MEDICARE

## 2024-03-07 VITALS — WEIGHT: 140 LBS | BODY MASS INDEX: 21.97 KG/M2 | HEIGHT: 67 IN

## 2024-03-07 DIAGNOSIS — Z12.31 SCREENING MAMMOGRAM FOR BREAST CANCER: ICD-10-CM

## 2024-03-07 PROCEDURE — 77063 BREAST TOMOSYNTHESIS BI: CPT

## 2024-03-07 PROCEDURE — 77067 SCR MAMMO BI INCL CAD: CPT

## 2024-05-16 ENCOUNTER — ANNUAL EXAM (OUTPATIENT)
Dept: GYNECOLOGY | Facility: CLINIC | Age: 64
End: 2024-05-16
Payer: MEDICARE

## 2024-05-16 VITALS
WEIGHT: 136 LBS | SYSTOLIC BLOOD PRESSURE: 126 MMHG | HEIGHT: 67 IN | DIASTOLIC BLOOD PRESSURE: 60 MMHG | BODY MASS INDEX: 21.35 KG/M2

## 2024-05-16 DIAGNOSIS — N95.2 VAGINAL ATROPHY: ICD-10-CM

## 2024-05-16 DIAGNOSIS — Z01.411 ENCOUNTER FOR GYNECOLOGICAL EXAMINATION WITH ABNORMAL FINDING: ICD-10-CM

## 2024-05-16 DIAGNOSIS — Z12.31 SCREENING MAMMOGRAM FOR BREAST CANCER: Primary | ICD-10-CM

## 2024-05-16 PROCEDURE — G0101 CA SCREEN;PELVIC/BREAST EXAM: HCPCS | Performed by: OBSTETRICS & GYNECOLOGY

## 2024-05-16 NOTE — PROGRESS NOTES
"Ambulatory Visit  Name: Gui Pearce      : 1960      MRN: [id number]  Encounter Provider: Paddy Rosas MD  Encounter Date: 2024   Encounter department: [facility] GYNECOLOGY [place]    Assessment & Plan       Normal breast exam  Vaginal atrophy  Normal Pap smear 2022  Normal mammogram 2024  Normal colonoscopy  due for repeat in 10 years  DEXA scan showing osteopenia     Plan: Recommend healthy diet and exercise.  Recommend daily Kegels.  1. Screening mammogram for breast cancer  -     Mammo screening bilateral w 3d & cad; Future; Expected date: 2025      History of Present Illness       c/s    Gui Pearce is a 63 y.o. female who presents for annual exam with no complaints.  Denies any pelvic pain vaginal bleeding breast bowel or bladder issues.  Presently not sexually active.  No change in family history.  Paternal aunt (breast cancer).  Cousin (colon cancer).  Medications reviewed.  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      /60   Ht 5' 7\" (1.702 m)   Wt 61.7 kg (136 lb)   LMP  (LMP Unknown)   BMI 21.30 kg/m²     Physical Exam  Vitals and nursing note reviewed.   Constitutional:       General: She is not in acute distress.     Appearance: She is well-developed.   HENT:      Head: Normocephalic and atraumatic.   Eyes:      Conjunctiva/sclera: Conjunctivae normal.   Cardiovascular:      Rate and Rhythm: Normal rate and regular rhythm.      Heart sounds: No murmur heard.  Pulmonary:      Effort: Pulmonary effort is normal. No respiratory distress.      Breath sounds: Normal breath sounds.   Abdominal:      Palpations: Abdomen is soft.      Tenderness: There is no abdominal tenderness.   Genitourinary:     Vagina: Normal.      Cervix: Normal.      Uterus: Normal.       Adnexa: Right adnexa normal and left adnexa normal.      Rectum: Normal.      Comments: Mild vaginal atrophy.  Normal urethra and Pickrell's " glands.  Musculoskeletal:         General: No swelling.      Cervical back: Neck supple.   Skin:     General: Skin is warm and dry.      Capillary Refill: Capillary refill takes less than 2 seconds.   Neurological:      Mental Status: She is alert.   Psychiatric:         Mood and Affect: Mood normal.       Administrative Statements

## 2024-07-29 ENCOUNTER — TELEPHONE (OUTPATIENT)
Age: 64
End: 2024-07-29

## 2024-07-29 NOTE — TELEPHONE ENCOUNTER
Contacted patient off of [facility]  to verify needs of services in attempts to offer patient an appointment. spoke with patient whom stated they were no longer interested and are established elsewhere.

## 2025-03-13 ENCOUNTER — HOSPITAL ENCOUNTER (OUTPATIENT)
Dept: RADIOLOGY | Age: 65
Discharge: HOME | End: 2025-03-13
Payer: MEDICARE

## 2025-03-13 VITALS — HEIGHT: 67 IN | BODY MASS INDEX: 21.97 KG/M2 | WEIGHT: 140 LBS

## 2025-03-13 DIAGNOSIS — Z12.31 SCREENING MAMMOGRAM FOR BREAST CANCER: ICD-10-CM

## 2025-03-13 PROCEDURE — 77067 SCR MAMMO BI INCL CAD: CPT

## 2025-03-13 PROCEDURE — 77063 BREAST TOMOSYNTHESIS BI: CPT

## 2025-03-18 ENCOUNTER — RESULTS FOLLOW-UP (OUTPATIENT)
Age: 65
End: 2025-03-18